# Patient Record
Sex: MALE
[De-identification: names, ages, dates, MRNs, and addresses within clinical notes are randomized per-mention and may not be internally consistent; named-entity substitution may affect disease eponyms.]

---

## 2022-09-14 PROBLEM — Z00.00 ENCOUNTER FOR PREVENTIVE HEALTH EXAMINATION: Status: ACTIVE | Noted: 2022-09-14

## 2022-09-19 ENCOUNTER — APPOINTMENT (OUTPATIENT)
Dept: PULMONOLOGY | Facility: CLINIC | Age: 57
End: 2022-09-19

## 2022-09-19 VITALS
SYSTOLIC BLOOD PRESSURE: 149 MMHG | WEIGHT: 179 LBS | HEART RATE: 69 BPM | OXYGEN SATURATION: 95 % | TEMPERATURE: 96.4 F | DIASTOLIC BLOOD PRESSURE: 60 MMHG | HEIGHT: 66 IN | BODY MASS INDEX: 28.77 KG/M2

## 2022-09-19 DIAGNOSIS — G47.10 HYPERSOMNIA, UNSPECIFIED: ICD-10-CM

## 2022-09-19 DIAGNOSIS — R06.83 SNORING: ICD-10-CM

## 2022-09-19 PROCEDURE — 99204 OFFICE O/P NEW MOD 45 MIN: CPT

## 2022-09-19 NOTE — PHYSICAL EXAM
[General Appearance - In No Acute Distress] : no acute distress [Normal Oropharynx] : normal oropharynx [Neck Appearance] : the appearance of the neck was normal [Neck Cervical Mass (___cm)] : no neck mass was observed [Jugular Venous Distention Increased] : there was no jugular-venous distention [] : no respiratory distress [Exaggerated Use Of Accessory Muscles For Inspiration] : no accessory muscle use [Involuntary Movements] : no involuntary movements were seen [Oriented To Time, Place, And Person] : oriented to person, place, and time [Affect] : the affect was normal [FreeTextEntry1] : large neck

## 2022-09-19 NOTE — HISTORY OF PRESENT ILLNESS
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 \par \par He reports snoring, but denies apnea. Both parents with ZOE on CPAP. He dozes off easily during the day and is not waking up refreshed in AM.  He was referred by his PCP at  for further follow up. \par \par He will be traveling to [place] this week for few months to visit a friend\par \par \par \par Sleep Routine:\par \par He goes to bed at 2A, sleep latency is about 5 min, he wakes up once,  WASO is about 1 hour and then he is up at  10-11A. He naps twice/week for 1 hour   [place] sleepiness scale (out of 24 points) = 8.

## 2022-09-19 NOTE — ASSESSMENT
[FreeTextEntry1] : 55 y/o M with snoring and possible sleep apnea who is here for initial evaluation. Based on history and physical exam, sleep disordered breathing is at least moderately likely.  The patient was advised to have overnight unattended home sleep testing as a first approach.  If this is not definitive may need overnight polysomnography. Will be seen in follow up after testing.

## 2023-03-02 ENCOUNTER — APPOINTMENT (OUTPATIENT)
Dept: SLEEP CENTER | Facility: HOME HEALTH | Age: 58
End: 2023-03-02
Payer: MEDICAID

## 2023-03-02 ENCOUNTER — OUTPATIENT (OUTPATIENT)
Dept: OUTPATIENT SERVICES | Facility: HOSPITAL | Age: 58
LOS: 1 days | End: 2023-03-02
Payer: MEDICAID

## 2023-03-02 DIAGNOSIS — G47.33 OBSTRUCTIVE SLEEP APNEA (ADULT) (PEDIATRIC): ICD-10-CM

## 2023-03-02 PROCEDURE — 95800 SLP STDY UNATTENDED: CPT

## 2023-03-02 PROCEDURE — 95800 SLP STDY UNATTENDED: CPT | Mod: 26

## 2023-03-16 ENCOUNTER — TRANSCRIPTION ENCOUNTER (OUTPATIENT)
Age: 58
End: 2023-03-16

## 2023-03-21 ENCOUNTER — APPOINTMENT (OUTPATIENT)
Dept: PULMONOLOGY | Facility: CLINIC | Age: 58
End: 2023-03-21
Payer: MEDICAID

## 2023-03-21 PROCEDURE — 99213 OFFICE O/P EST LOW 20 MIN: CPT | Mod: 95

## 2023-03-21 NOTE — REASON FOR VISIT
[Follow-Up] : a follow-up visit [Sleep Apnea] : sleep apnea [Home] : at home, [unfilled] , at the time of the visit. [Medical Office: ([facility])___] : at the medical office located in  [Patient] : the patient

## 2023-03-21 NOTE — ASSESSMENT
[FreeTextEntry1] : Mild sleep disordered breathing\par \par Treatment options for sleep disordered breathing were discussed.  The most rapid and successful treatment remains nasal CPAP or BilevelPAP.  Alternatives include upper airway surgery such as uvulopharyngoplasty or a dental appliance (better for milder cases).  Recently hypoglossal nerve stimulation has been used.  Positional therapy (avoidance of supine posture) can be helpful, and all patients should try to maintain a healthy weight and avoid alcohol or other sedating medications close to bedtime \par \par Do not think CPAP treatment is required.  Weight loss alone may be successful, and he is warned about increasing weight.  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  I have suggested he discuss this with his dentist, if his dentist is not trained to fabricate oral appliances I can forward him a list of dentists with experience.

## 2023-03-21 NOTE — HISTORY OF PRESENT ILLNESS
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 \par \par He reports snoring, but denies apnea. Both parents with ZOE on CPAP. He dozes off easily during the day and is not waking up refreshed in AM.  He was referred by his PCP at  for further follow up. \par \par He will be traveling to [place] this week for few months to visit a friend\par \par Sleep Routine:\par \par He goes to bed at 2A, sleep latency is about 5 min, he wakes up once,  WASO is about 1 hour and then he is up at  10-11A. He naps twice/week for 1 hour   [place] sleepiness scale (out of 24 points) = 8. \par \par 3/21/23:: Telehealth visit: Reviewed recent home sleep study with patient.  Study done March 2, 2023 showed mild sleep disordered breathing, apnea-hypopnea index 8.3, 3.1 minutes low 89%.  Good sleep, relatively mild snoring observed.  Since last seen he has been told his snoring might be a little better, he is trying to lose weight.